# Patient Record
Sex: FEMALE | Race: BLACK OR AFRICAN AMERICAN | Employment: UNEMPLOYED | URBAN - METROPOLITAN AREA
[De-identification: names, ages, dates, MRNs, and addresses within clinical notes are randomized per-mention and may not be internally consistent; named-entity substitution may affect disease eponyms.]

---

## 2018-07-09 ENCOUNTER — OFFICE VISIT (OUTPATIENT)
Dept: OBGYN CLINIC | Age: 23
End: 2018-07-09

## 2018-07-09 VITALS
HEART RATE: 83 BPM | TEMPERATURE: 98.6 F | SYSTOLIC BLOOD PRESSURE: 103 MMHG | BODY MASS INDEX: 15.3 KG/M2 | DIASTOLIC BLOOD PRESSURE: 63 MMHG | OXYGEN SATURATION: 100 % | WEIGHT: 95.2 LBS | HEIGHT: 66 IN | RESPIRATION RATE: 16 BRPM

## 2018-07-09 DIAGNOSIS — N92.6 MISSED MENSES: Primary | ICD-10-CM

## 2018-07-09 DIAGNOSIS — R11.0 NAUSEA: ICD-10-CM

## 2018-07-09 DIAGNOSIS — Z3A.01 LESS THAN 8 WEEKS GESTATION OF PREGNANCY: ICD-10-CM

## 2018-07-09 LAB
HCG URINE, QL. (POC): POSITIVE
VALID INTERNAL CONTROL?: YES

## 2018-07-09 RX ORDER — ONDANSETRON 4 MG/1
4 TABLET, ORALLY DISINTEGRATING ORAL
Qty: 30 TAB | Refills: 3 | Status: SHIPPED | OUTPATIENT
Start: 2018-07-09 | End: 2022-01-26

## 2018-07-09 NOTE — MR AVS SNAPSHOT
[address] [facility] 
 
 
 [address], [address] [place] [address] 
[phone] Patient: Lexy Mederos MRN: [id number] :1995 Visit Information Date & Time Provider Department Dept. Phone Encounter #  
 2018  2:30 PM MD Glendy Palacio OB [phone] [id number]  
  
 2018  9:30 AM  
OB VISIT with Richard Loaiza MD  
[facility] OB GYN ([address] [facility]) Appt Note: ob visit [address], [place] [address] [id number] [phone]  
  
   
 [address], [address] [facility] Upcoming Health Maintenance Date Due  
 HPV Age 9Y-34Y (1 of 3 - Female 3 Dose Series) 10/3/2008 Influenza Age 5 to Adult 2018 Allergies as of 2018  Review Complete On: 2018 By: Richard Loaiza MD  
 No Known Allergies Current Immunizations  Never Reviewed No immunizations on file. Not reviewed this visit You Were Diagnosed With   
  
 Codes Comments Missed menses    -  Primary ICD-10-CM: N92.6 ICD-9-CM: 626.4 Less than 8 weeks gestation of pregnancy     ICD-10-CM: Z3A.01 
ICD-9-CM: V22.2 Nausea     ICD-10-CM: R11.0 ICD-9-CM: 787.02 Vitals BP Pulse Temp Resp Height(growth percentile) Weight(growth percentile) 103/63 (BP 1 Location: Left arm, BP Patient Position: Sitting) 83 98.6 °F (37 °C) (Oral) 16 5' 6\" (1.676 m) 95 lb 3.2 oz (43.2 kg) LMP SpO2 BMI OB Status Smoking Status 2018 100% 15.37 kg/m2 Pregnant Never Smoker BMI and BSA Data Body Mass Index Body Surface Area  
 15.37 kg/m 2 1.42 m 2 Preferred Pharmacy Pharmacy Name Phone [address] [facility] [address], [address] [phone] Your Updated Medication List  
  
   
This list is accurate as of 18  3:46 PM.  Always use your most recent med list.  
  
  
  
  
 ondansetron 4 mg disintegrating tablet Commonly known as:  ZOFRAN ODT Take 1 Tab by mouth every eight (8) hours as needed for Nausea. Indications: EXCESSIVE VOMITING IN PREGNANCY  
  
 prenatal vit-iron fumarate-fa 27 mg iron- 0.8 mg Tab tablet Take 1 Tab by mouth daily. Indications: pregnancy Prescriptions Sent to Pharmacy Refills  
 prenatal vit-iron fumarate-fa 27 mg iron- 0.8 mg tab tablet 3 Sig: Take 1 Tab by mouth daily. Indications: pregnancy Class: Normal  
 Pharmacy: [address] , [address] [place] [address] #: [phone] Route: Oral  
 ondansetron (ZOFRAN ODT) 4 mg disintegrating tablet 3 Sig: Take 1 Tab by mouth every eight (8) hours as needed for Nausea. Indications: EXCESSIVE VOMITING IN PREGNANCY Class: Normal  
 Pharmacy: [address] , [address] [place] [address] #: [phone] Route: Oral  
  
We Performed the Following AMB POC URINE PREGNANCY TEST, VISUAL COLOR COMPARISON [53517 CPT(R)] Patient Instructions Nausea and Vomiting: Care Instructions Your Care Instructions When you are nauseated, you may feel weak and sweaty and notice a lot of saliva in your mouth. Nausea often leads to vomiting. Most of the time you do not need to worry about nausea and vomiting, but they can be signs of other illnesses. Two common causes of nausea and vomiting are stomach flu and food poisoning. Nausea and vomiting from viral stomach flu will usually start to improve within 24 hours. Nausea and vomiting from food poisoning may last from 12 to 48 hours. The doctor has checked you carefully, but problems can develop later. If you notice any problems or new symptoms, get medical treatment right away. Follow-up care is a key part of your treatment and safety. Be sure to make and go to all appointments, and call your doctor if you are having problems. It's also a good idea to know your test results and keep a list of the medicines you take. How can you care for yourself at home? · To prevent dehydration, drink plenty of fluids, enough so that your urine is light yellow or clear like water. Choose water and other caffeine-free clear liquids until you feel better. If you have kidney, heart, or liver disease and have to limit fluids, talk with your doctor before you increase the amount of fluids you drink. · Rest in bed until you feel better. · When you are able to eat, try clear soups, mild foods, and liquids until all symptoms are gone for 12 to 48 hours. Other good choices include dry toast, crackers, cooked cereal, and gelatin dessert, such as Jell-O. When should you call for help? Call 911 anytime you think you may need emergency care. For example, call if: 
? · You passed out (lost consciousness). ?Call your doctor now or seek immediate medical care if: 
? · You have symptoms of dehydration, such as: ¨ Dry eyes and a dry mouth. ¨ Passing only a little dark urine. ¨ Feeling thirstier than usual.  
? · You have new or worsening belly pain. ? · You have a new or higher fever. ? · You vomit blood or what looks like coffee grounds. ? Watch closely for changes in your health, and be sure to contact your doctor if: 
? · You have ongoing nausea and vomiting. ? · Your vomiting is getting worse. ? · Your vomiting lasts longer than 2 days. ? · You are not getting better as expected. Where can you learn more? Go to http://shanti-dhruv.info/. Enter 25 753728 in the search box to learn more about \"Nausea and Vomiting: Care Instructions. \" Current as of: March 20, 2017 Content Version: 11.4 © 9902-0762 [employer]. Care instructions adapted under license by Veeva (which disclaims liability or warranty for this information).  If you have questions about a medical condition or this instruction, always ask your healthcare professional. Y[employer] disclaims any warranty or liability for your use of this information. Weeks 6 to 10 of Your Pregnancy: Care Instructions Your Care Instructions Congratulations on your pregnancy. This is an exciting and important time for you. During the first 6 to 10 weeks of your pregnancy, your body goes through many changes. Your baby grows very fast, even though you cannot feel it yet. You may start to notice that you feel different, both in your body and your emotions. Because each woman's pregnancy is unique, there is no right way to feel. You may feel the healthiest you have ever been, or you may feel tired or sick to your stomach (\"morning sickness\"). These early weeks are a time to make healthy choices and to eat the best foods for you and your baby. This care sheet will give you some ideas. This is also a good time to think about birth defects testing. These are tests done during pregnancy to look for possible problems with the baby. First trimester tests for birth defects can be done between 8 and 17 weeks of pregnancy, depending on the test. Talk with your doctor about what kinds of tests are available. Follow-up care is a key part of your treatment and safety. Be sure to make and go to all appointments, and call your doctor if you are having problems. It's also a good idea to know your test results and keep a list of the medicines you take. How can you care for yourself at home? Eat well · Eat at least 3 meals and 2 healthy snacks every day. Eat fresh, whole foods, including: ¨ 7 or more servings of bread, tortillas, cereal, rice, pasta, or oatmeal. 
¨ 3 or more servings of vegetables, especially leafy green vegetables. ¨ 2 or more servings of fruits. ¨ 3 or more servings of milk, yogurt, or cheese. ¨ 2 or more servings of meat, turkey, chicken, fish, eggs, or dried beans. · Drink plenty of fluids, especially water. Avoid sodas and other sweetened drinks. · Choose foods that have important vitamins for your baby, such as calcium, iron, and folate. ¨ Dairy products, tofu, canned fish with bones, almonds, broccoli, dark leafy greens, corn tortillas, and fortified orange juice are good sources of calcium. ¨ Beef, poultry, liver, spinach, lentils, dried beans, fortified cereals, and dried fruits are rich in iron. ¨ Dark leafy greens, broccoli, asparagus, liver, fortified cereals, orange juice, peanuts, and almonds are good sources of folate. · Avoid foods that could harm your baby. ¨ Do not eat raw or undercooked meat, chicken, or fish (such as sushi or raw oysters). ¨ Do not eat raw eggs or foods that contain raw eggs, such as Caesar dressing. ¨ Do not eat soft cheeses and unpasteurized dairy foods, such as Brie, feta, or blue cheese. ¨ Do not eat fish that contains a lot of mercury, such as shark, swordfish, tilefish, or kartik mackerel. Do not eat more than 6 ounces of tuna each week. ¨ Do not eat raw sprouts, especially alfalfa sprouts. ¨ Cut down on caffeine, such as coffee, tea, and cola. Protect yourself and your baby · Do not touch sumit litter or cat feces. They can cause an infection that could harm your baby. · High body temperature can be harmful to your baby. So if you want to use a sauna or hot tub, be sure to talk to your doctor about how to use it safely. [place] with morning sickness · Sip small amounts of water, juices, or shakes. Try drinking between meals, not with meals. · Eat 5 or 6 small meals a day. Try dry toast or crackers when you first get up, and eat breakfast a little later. · Avoid spicy, greasy, and fatty foods. · When you feel sick, open your windows or go for a short walk to get fresh air. · Try nausea wristbands. These help some women. · Tell your doctor if you think your prenatal vitamins make you sick. Where can you learn more? Go to http://shanti-dhruv.info/. Enter G112 in the search box to learn more about \"Weeks 6 to 10 of Your Pregnancy: Care Instructions. \" Current as of: March 16, 2017 Content Version: 11.4 © 1556-1547 Healthwise, Incorporated. Care instructions adapted under license by Remote Assistant (which disclaims liability or warranty for this information). If you have questions about a medical condition or this instruction, always ask your healthcare professional. [place] [address] any warranty or liability for your use of this information. Introducing [facility]! Jose Alfredo Mosley introduces OurStay patient portal. Now you can access parts of your medical record, email your doctor's office, and request medication refills online. 1. In your internet browser, go to [email or web address] 2. Click on the First Time User? Click Here link in the Sign In box. You will see the New Member Sign Up page. 3. Enter your OurStay Access Code exactly as it appears below. You will not need to use this code after youve completed the sign-up process. If you do not sign up before the expiration date, you must request a new code. · OurStay Access Code: [email or web address] Expires: 10/7/2018  2:58 PM 
 
4. Enter the last four digits of your Social Security Number (xxxx) and Date of Birth (mm/dd/yyyy) as indicated and click Submit. You will be taken to the next sign-up page. 5. Create a OurStay ID. This will be your OurStay login ID and cannot be changed, so think of one that is secure and easy to remember. 6. Create a OurStay password. You can change your password at any time. 7. Enter your Password Reset Question and Answer. This can be used at a later time if you forget your password. 8. Enter your e-mail address. You will receive e-mail notification when new information is available in [address] 9. Click Sign Up. You can now view and download portions of your medical record. 10. Click the Download Summary menu link to download a portable copy of your medical information. If you have questions, please visit the Frequently Asked Questions section of the PayRange website. Remember, PayRange is NOT to be used for urgent needs. For medical emergencies, dial 911. Now available from your iPhone and Android! Please provide this summary of care documentation to your next provider. If you have any questions after today's visit, please call [phone].

## 2018-07-09 NOTE — PATIENT INSTRUCTIONS
Nausea and Vomiting: Care Instructions Your Care Instructions When you are nauseated, you may feel weak and sweaty and notice a lot of saliva in your mouth. Nausea often leads to vomiting. Most of the time you do not need to worry about nausea and vomiting, but they can be signs of other illnesses. Two common causes of nausea and vomiting are stomach flu and food poisoning. Nausea and vomiting from viral stomach flu will usually start to improve within 24 hours. Nausea and vomiting from food poisoning may last from 12 to 48 hours. The doctor has checked you carefully, but problems can develop later. If you notice any problems or new symptoms, get medical treatment right away. Follow-up care is a key part of your treatment and safety. Be sure to make and go to all appointments, and call your doctor if you are having problems. It's also a good idea to know your test results and keep a list of the medicines you take. How can you care for yourself at home? · To prevent dehydration, drink plenty of fluids, enough so that your urine is light yellow or clear like water. Choose water and other caffeine-free clear liquids until you feel better. If you have kidney, heart, or liver disease and have to limit fluids, talk with your doctor before you increase the amount of fluids you drink. · Rest in bed until you feel better. · When you are able to eat, try clear soups, mild foods, and liquids until all symptoms are gone for 12 to 48 hours. Other good choices include dry toast, crackers, cooked cereal, and gelatin dessert, such as Jell-O. When should you call for help? Call 911 anytime you think you may need emergency care. For example, call if: 
? · You passed out (lost consciousness). ?Call your doctor now or seek immediate medical care if: 
? · You have symptoms of dehydration, such as: ¨ Dry eyes and a dry mouth. ¨ Passing only a little dark urine.  
¨ Feeling thirstier than usual.  
? · You have new or worsening belly pain. ? · You have a new or higher fever. ? · You vomit blood or what looks like coffee grounds. ? Watch closely for changes in your health, and be sure to contact your doctor if: 
? · You have ongoing nausea and vomiting. ? · Your vomiting is getting worse. ? · Your vomiting lasts longer than 2 days. ? · You are not getting better as expected. Where can you learn more? Go to http://shanti-dhruv.info/. Enter 25 112356 in the search box to learn more about \"Nausea and Vomiting: Care Instructions. \" Current as of: March 20, 2017 Content Version: 11.4 © 8810-6627 [employer]B Access. Care instructions adapted under license by Jet[employer]ore (which disclaims liability or warranty for this information). If you have questions about a medical condition or this instruction, always ask your healthcare professional. [place] [address] any warranty or liability for your use of this information. Weeks 6 to 10 of Your Pregnancy: Care Instructions Your Care Instructions Congratulations on your pregnancy. This is an exciting and important time for you. During the first 6 to 10 weeks of your pregnancy, your body goes through many changes. Your baby grows very fast, even though you cannot feel it yet. You may start to notice that you feel different, both in your body and your emotions. Because each woman's pregnancy is unique, there is no right way to feel. You may feel the healthiest you have ever been, or you may feel tired or sick to your stomach (\"morning sickness\"). These early weeks are a time to make healthy choices and to eat the best foods for you and your baby. This care sheet will give you some ideas. This is also a good time to think about birth defects testing. These are tests done during pregnancy to look for possible problems with the baby.  First trimester tests for birth defects can be done between 8 and 13 weeks of pregnancy, depending on the test. Talk with your doctor about what kinds of tests are available. Follow-up care is a key part of your treatment and safety. Be sure to make and go to all appointments, and call your doctor if you are having problems. It's also a good idea to know your test results and keep a list of the medicines you take. How can you care for yourself at home? Eat well · Eat at least 3 meals and 2 healthy snacks every day. Eat fresh, whole foods, including: ¨ 7 or more servings of bread, tortillas, cereal, rice, pasta, or oatmeal. 
¨ 3 or more servings of vegetables, especially leafy green vegetables. ¨ 2 or more servings of fruits. ¨ 3 or more servings of milk, yogurt, or cheese. ¨ 2 or more servings of meat, turkey, chicken, fish, eggs, or dried beans. · Drink plenty of fluids, especially water. Avoid sodas and other sweetened drinks. · Choose foods that have important vitamins for your baby, such as calcium, iron, and folate. ¨ Dairy products, tofu, canned fish with bones, almonds, broccoli, dark leafy greens, corn tortillas, and fortified orange juice are good sources of calcium. ¨ Beef, poultry, liver, spinach, lentils, dried beans, fortified cereals, and dried fruits are rich in iron. ¨ Dark leafy greens, broccoli, asparagus, liver, fortified cereals, orange juice, peanuts, and almonds are good sources of folate. · Avoid foods that could harm your baby. ¨ Do not eat raw or undercooked meat, chicken, or fish (such as sushi or raw oysters). ¨ Do not eat raw eggs or foods that contain raw eggs, such as Caesar dressing. ¨ Do not eat soft cheeses and unpasteurized dairy foods, such as Brie, feta, or blue cheese. ¨ Do not eat fish that contains a lot of mercury, such as shark, swordfish, tilefish, or kartik mackerel. Do not eat more than 6 ounces of tuna each week. ¨ Do not eat raw sprouts, especially alfalfa sprouts. ¨ Cut down on caffeine, such as coffee, tea, and cola.  
Protect yourself and your baby · Do not touch sumit litter or cat feces. They can cause an infection that could harm your baby. · High body temperature can be harmful to your baby. So if you want to use a sauna or hot tub, be sure to talk to your doctor about how to use it safely. [place] with morning sickness · Sip small amounts of water, juices, or shakes. Try drinking between meals, not with meals. · Eat 5 or 6 small meals a day. Try dry toast or crackers when you first get up, and eat breakfast a little later. · Avoid spicy, greasy, and fatty foods. · When you feel sick, open your windows or go for a short walk to get fresh air. · Try nausea wristbands. These help some women. · Tell your doctor if you think your prenatal vitamins make you sick. Where can you learn more? Go to http://shanti-dhruv.info/. Enter G112 in the search box to learn more about \"Weeks 6 to 10 of Your Pregnancy: Care Instructions. \" Current as of: March 16, 2017 Content Version: 11.4 © 2563-4950 [employer]CLUB. Care instructions adapted under license by [employer]ova [employer] (which disclaims liability or warranty for this information). If you have questions about a medical condition or this instruction, always ask your healthcare professional. [address] any warranty or liability for your use of this information.

## 2018-07-09 NOTE — PROGRESS NOTES
Progress Note    Patient: Ameena Whatley  MRN: [id number]  Date: 7/9/2018     Age:  23 y.o.,      Sex: female    YOB: 1995    Ameena Whatley is a 23y.o. year-old female, G 1 P 0 whose last normal menstrual period was 5/7/18 . She is not on any contraceptive. She presents today for missed menses. Review of Systems: General, Cardiovascular, Respiratory, Gastrointestinal, Genitourinary, Neuropsychiatry, Musculoskeletal. Patient denies any problems associated with these systems except for the amenorrhea. .    General Examination: She is a well-developed, well-nourished female in no acute distress. Cardiovascular system--normal sinus rhythm, no murmurs or gallop. Abdomen--soft, nontender, and normal active. Pelvic exam--External genitalia and BUS--normal.             Cervix: bluish cervical coloration    Vagina: vaginal discharge white and odorless                          Uterus: enlarged, 9 weeks size, regular contour    Adnexa: normal bimanual exam    Impression. --Missed Menses. Early Pregnancy    Plan: --RTC for Initial prenatal care.                Rx for prenatal vitamins sent    Mario Johnson MD  July 9, 2018

## 2021-08-06 LAB
CHLAMYDIA, EXTERNAL: NEGATIVE
HBSAG, EXTERNAL: NEGATIVE
HIV, EXTERNAL: NEGATIVE
N. GONORRHEA, EXTERNAL: NEGATIVE
RPR, EXTERNAL: NON REACTIVE
RUBELLA, EXTERNAL: NORMAL
TYPE, ABO & RH, EXTERNAL: NORMAL

## 2022-01-07 LAB — GRBS, EXTERNAL: NEGATIVE

## 2022-01-24 ENCOUNTER — HOSPITAL ENCOUNTER (INPATIENT)
Age: 27
LOS: 2 days | Discharge: HOME | DRG: 560 | End: 2022-01-26
Attending: OBSTETRICS & GYNECOLOGY | Admitting: OBSTETRICS & GYNECOLOGY
Payer: MEDICAID

## 2022-01-24 PROBLEM — Z34.93 NORMAL IUP (INTRAUTERINE PREGNANCY) ON PRENATAL ULTRASOUND, THIRD TRIMESTER: Status: ACTIVE | Noted: 2022-01-24

## 2022-01-24 LAB
ABO + RH BLD: NORMAL
BASOPHILS # BLD: 0 K/UL (ref 0–0.1)
BASOPHILS NFR BLD: 0 % (ref 0–2)
BLOOD GROUP ANTIBODIES SERPL: NORMAL
DIFFERENTIAL METHOD BLD: ABNORMAL
EOSINOPHIL # BLD: 0 K/UL (ref 0–0.4)
EOSINOPHIL NFR BLD: 0 % (ref 0–5)
ERYTHROCYTE [DISTWIDTH] IN BLOOD BY AUTOMATED COUNT: 15.9 % (ref 11.6–14.5)
HCT VFR BLD AUTO: 31.9 % (ref 35–45)
HGB BLD-MCNC: 9.9 G/DL (ref 12–16)
IMM GRANULOCYTES # BLD AUTO: 0.1 K/UL (ref 0–0.04)
IMM GRANULOCYTES NFR BLD AUTO: 1 % (ref 0–0.5)
LYMPHOCYTES # BLD: 1.3 K/UL (ref 0.9–3.6)
LYMPHOCYTES NFR BLD: 12 % (ref 21–52)
MCH RBC QN AUTO: 21.9 PG (ref 24–34)
MCHC RBC AUTO-ENTMCNC: 31 G/DL (ref 31–37)
MCV RBC AUTO: 70.6 FL (ref 78–100)
MONOCYTES # BLD: 0.8 K/UL (ref 0.05–1.2)
MONOCYTES NFR BLD: 7 % (ref 3–10)
NEUTS SEG # BLD: 8.5 K/UL (ref 1.8–8)
NEUTS SEG NFR BLD: 80 % (ref 40–73)
NRBC # BLD: 0 K/UL (ref 0–0.01)
NRBC BLD-RTO: 0 PER 100 WBC
PLATELET # BLD AUTO: 290 K/UL (ref 135–420)
PMV BLD AUTO: 11.3 FL (ref 9.2–11.8)
RBC # BLD AUTO: 4.52 M/UL (ref 4.2–5.3)
SPECIMEN EXP DATE BLD: NORMAL
WBC # BLD AUTO: 10.6 K/UL (ref 4.6–13.2)

## 2022-01-24 PROCEDURE — 86901 BLOOD TYPING SEROLOGIC RH(D): CPT

## 2022-01-24 PROCEDURE — 74011250636 HC RX REV CODE- 250/636: Performed by: ADVANCED PRACTICE MIDWIFE

## 2022-01-24 PROCEDURE — 75410000003 HC RECOV DEL/VAG/CSECN EA 0.5 HR

## 2022-01-24 PROCEDURE — 85025 COMPLETE CBC W/AUTO DIFF WBC: CPT

## 2022-01-24 PROCEDURE — 75410000000 HC DELIVERY VAGINAL/SINGLE

## 2022-01-24 PROCEDURE — 74011250637 HC RX REV CODE- 250/637: Performed by: ADVANCED PRACTICE MIDWIFE

## 2022-01-24 PROCEDURE — 65270000029 HC RM PRIVATE

## 2022-01-24 PROCEDURE — 75410000002 HC LABOR FEE PER 1 HR

## 2022-01-24 RX ORDER — MINERAL OIL
30 OIL (ML) ORAL AS NEEDED
Status: COMPLETED | OUTPATIENT
Start: 2022-01-24 | End: 2022-01-24

## 2022-01-24 RX ORDER — ACETAMINOPHEN 325 MG/1
650 TABLET ORAL
Status: DISCONTINUED | OUTPATIENT
Start: 2022-01-24 | End: 2022-01-26 | Stop reason: HOSPADM

## 2022-01-24 RX ORDER — PROMETHAZINE HYDROCHLORIDE 25 MG/ML
25 INJECTION, SOLUTION INTRAMUSCULAR; INTRAVENOUS
Status: DISCONTINUED | OUTPATIENT
Start: 2022-01-24 | End: 2022-01-26 | Stop reason: HOSPADM

## 2022-01-24 RX ORDER — OXYTOCIN/RINGER'S LACTATE 30/500 ML
10 PLASTIC BAG, INJECTION (ML) INTRAVENOUS AS NEEDED
Status: COMPLETED | OUTPATIENT
Start: 2022-01-24 | End: 2022-01-24

## 2022-01-24 RX ORDER — SODIUM CHLORIDE, SODIUM LACTATE, POTASSIUM CHLORIDE, CALCIUM CHLORIDE 600; 310; 30; 20 MG/100ML; MG/100ML; MG/100ML; MG/100ML
125 INJECTION, SOLUTION INTRAVENOUS CONTINUOUS
Status: DISCONTINUED | OUTPATIENT
Start: 2022-01-24 | End: 2022-01-25 | Stop reason: HOSPADM

## 2022-01-24 RX ORDER — AMOXICILLIN 250 MG
1 CAPSULE ORAL
Status: DISCONTINUED | OUTPATIENT
Start: 2022-01-24 | End: 2022-01-26 | Stop reason: HOSPADM

## 2022-01-24 RX ORDER — HYDROMORPHONE HYDROCHLORIDE 1 MG/ML
1 INJECTION, SOLUTION INTRAMUSCULAR; INTRAVENOUS; SUBCUTANEOUS
Status: DISCONTINUED | OUTPATIENT
Start: 2022-01-24 | End: 2022-01-24

## 2022-01-24 RX ORDER — BUTORPHANOL TARTRATE 2 MG/ML
2 INJECTION INTRAMUSCULAR; INTRAVENOUS
Status: DISCONTINUED | OUTPATIENT
Start: 2022-01-24 | End: 2022-01-24

## 2022-01-24 RX ORDER — NALBUPHINE HYDROCHLORIDE 10 MG/ML
10 INJECTION, SOLUTION INTRAMUSCULAR; INTRAVENOUS; SUBCUTANEOUS
Status: DISCONTINUED | OUTPATIENT
Start: 2022-01-24 | End: 2022-01-24

## 2022-01-24 RX ORDER — LIDOCAINE HYDROCHLORIDE 10 MG/ML
20 INJECTION, SOLUTION EPIDURAL; INFILTRATION; INTRACAUDAL; PERINEURAL AS NEEDED
Status: DISCONTINUED | OUTPATIENT
Start: 2022-01-24 | End: 2022-01-24

## 2022-01-24 RX ORDER — TERBUTALINE SULFATE 1 MG/ML
0.25 INJECTION SUBCUTANEOUS
Status: DISCONTINUED | OUTPATIENT
Start: 2022-01-24 | End: 2022-01-25 | Stop reason: HOSPADM

## 2022-01-24 RX ORDER — OXYTOCIN/0.9 % SODIUM CHLORIDE 30/500 ML
87.3 PLASTIC BAG, INJECTION (ML) INTRAVENOUS AS NEEDED
Status: DISCONTINUED | OUTPATIENT
Start: 2022-01-24 | End: 2022-01-25 | Stop reason: HOSPADM

## 2022-01-24 RX ORDER — IBUPROFEN 400 MG/1
800 TABLET ORAL
Status: DISCONTINUED | OUTPATIENT
Start: 2022-01-24 | End: 2022-01-26 | Stop reason: HOSPADM

## 2022-01-24 RX ORDER — LANOLIN ALCOHOL/MO/W.PET/CERES
3 CREAM (GRAM) TOPICAL
Status: DISCONTINUED | OUTPATIENT
Start: 2022-01-24 | End: 2022-01-26 | Stop reason: HOSPADM

## 2022-01-24 RX ORDER — METHYLERGONOVINE MALEATE 0.2 MG/ML
0.2 INJECTION INTRAVENOUS AS NEEDED
Status: DISCONTINUED | OUTPATIENT
Start: 2022-01-24 | End: 2022-01-25 | Stop reason: HOSPADM

## 2022-01-24 RX ORDER — ONDANSETRON 2 MG/ML
4 INJECTION INTRAMUSCULAR; INTRAVENOUS
Status: DISCONTINUED | OUTPATIENT
Start: 2022-01-24 | End: 2022-01-25 | Stop reason: HOSPADM

## 2022-01-24 RX ADMIN — Medication 10000 MILLI-UNITS: at 21:34

## 2022-01-24 RX ADMIN — MINERAL OIL 30 ML: 1000 SOLUTION ORAL at 21:15

## 2022-01-24 NOTE — PROGRESS NOTES
1500 , 39 1/7 weeks pregnant here for contractions since yesterday. 1520 Pt is 5cm, M Angie informed. Admission orders received. 1710 Pt on birthing ball. 191 Bedside and Verbal shift change report given to 1334 Alicia Cardona (oncoming nurse) by Sofi Hunt RN  (offgoing nurse). Report included the following information SBAR, Kardex, Intake/Output, MAR, Recent Results and Med Rec Status.

## 2022-01-24 NOTE — H&P
History & Physical    Name: Brianne Montes MRN: [id number]  SSN: [id number]    YOB: 1995  Age: 27 y.o. Sex: female        Subjective:     Estimated Date of Delivery: 22  OB History    Para Term  AB Living   1             SAB IAB Ectopic Molar Multiple Live Births                    # Outcome Date GA Lbr Brandt/2nd Weight Sex Delivery Anes PTL Lv   1 Current                Ms. Genaro Pruitt is admitted with pregnancy at 36w3d for active labor. Prenatal course was complicated by anemia. Please see prenatal records for details. No past medical history on file. No past surgical history on file. Social History     Occupational History    Not on file   Tobacco Use    Smoking status: Never Smoker    Smokeless tobacco: Never Used   Substance and Sexual Activity    Alcohol use: No    Drug use: No    Sexual activity: Never     Partners: Male     Birth control/protection: None     Family History   Family history unknown: Yes       No Known Allergies  Prior to Admission medications    Medication Sig Start Date End Date Taking? Authorizing Provider   prenatal vit-iron fumarate-fa 27 mg iron- 0.8 mg tab tablet Take 1 Tab by mouth daily. Indications: pregnancy 18  Yes Jerilyn Longo MD   ondansetron (ZOFRAN ODT) 4 mg disintegrating tablet Take 1 Tab by mouth every eight (8) hours as needed for Nausea. Indications: EXCESSIVE VOMITING IN PREGNANCY  Patient not taking: Reported on 2022   Jerilyn Longo MD        Review of Systems: A comprehensive review of systems was negative except for that written in the HPI.     Objective:     Vitals:  Vitals:    22 1501 22 1503 22 1508 22 1510   BP: 134/81      Pulse: 99      Temp:  98 °F (36.7 °C)     SpO2:  98% 99% 92%   Weight: 62.6 kg (138 lb)      Height: 5' 1\" (1.549 m)           Physical Exam:  Cervical Exam: 5 cm dilated    100% effaced    -1 station    Presenting Part: cephalic  Cervical Position: mid position  Membranes:  Intact  Fetal Heart Rate: Baseline: 1-5 per minute  Variability: moderate  Accelerations: yes  Decelerations: none  Uterine contractions: regular, every 1-5 minutes    Prenatal Labs:   No results found for: ABORH, RUBELLAEXT, GRBSEXT, HBSAGEXT, HIVEXT, RPREXT, GONNOEXT, CHLAMEXT, ABORHEXT, RUBELLAEXT, GRBSEXT, HBSAGEXT, HIVEXT, RPREXT, GONNOEXT, CHLAMEXT      Assessment/Plan:     Active Problems:    * No active hospital problems. *       Plan: Admit for active labor. Group B Strep was negative.  BIRDIE upon request. Anticipate vaginal delivery

## 2022-01-25 LAB
HCT VFR BLD AUTO: 30.7 % (ref 35–45)
HGB BLD-MCNC: 9.8 G/DL (ref 12–16)

## 2022-01-25 PROCEDURE — 65270000029 HC RM PRIVATE

## 2022-01-25 PROCEDURE — 85018 HEMOGLOBIN: CPT

## 2022-01-25 PROCEDURE — 36415 COLL VENOUS BLD VENIPUNCTURE: CPT

## 2022-01-25 NOTE — PROGRESS NOTES
DELIVERY SUMMARY    Patient:  Raford Dakin    Delivery Type: Vaginal, Spontaneous   Delivery Date:   Delivery Time:  2129 PM      Birth Weight:   2750 g     Sex: female     Delivery Clinician:  Merissa Morales   Gestational Age: 36+2 wga     Presentation: Vertex   Position:   OA to Right Occiput  Anterior      Apgars were 9  and 9       Resuscitation Method: Tactile Stimulation;Suctioning-bulb     Meconium Stained: None    Living Status: Living        Placenta Date/Time: [id number]   2134   PM   Placenta Removal: Spontaneous, trailing membranes  Placenta Appearance: Normal     Cord Information: 3 Vessels          Disposition of Cord Blood: lab ABO/DNA    Blood Gases Sent?:  No         Cord pH:  none     Episiotomy: none  Laceration(s):  bilateral periurethral - no repair    Estimated Blood Loss (ml): 100     Labor Events  Method:     spontaneous  Augmentation:  AROM  Cervical Ripening:   n/a     Induction - no     Induction Indication - N/A     Induction Method - N/A     Complications - none     NICU Admit - no     HTN Disorders - none     Diabetes - N/A     Operative Vaginal Delivery - none     Additional Delivery Comments -  viable female infant, OA to MARGOT, terminal meconium noted, infant to mother in stable condition for apgars of 9/9 , delayed cord clamping for 3 minutes until pulsations ceased, cord clamped and cut by FOB and cord blood for ABO/DNA obtained, pitocin infusing for active management of third stage, placenta delivered appearing intact, [place], spont.  With 3 vc - trailing membranes, fundus firm, perineum inspected - bilateral periurethral lacerations noted -  hemostatic,  ml,  Complications: none;    mother and infant in stable condition

## 2022-01-25 NOTE — PROGRESS NOTES
OB Labor Note    Assessment:   G1 @ 39+1 wga, IUP in active spontaneous labor - AROMc this exam   Low risk PPH   Cat I fetal tracing   Plans NCB - doing well with ctx    Plan:   Continue to monitor labor   Anticipate    Expectant Management       Subjective:   Pt. does not have any complaints. Pt. is feeling contractions. Pt. is feeling fetal movement. Objective:  V/E: 8/100/ 0 AROMc    Ctx: every 2-4 min. Cat I fetal tracing - baseline rate 135, accelerations, no decelerations, moderate variability     Visit Vitals  /75   Pulse 74   Temp 98.6 °F (37 °C)   Resp 16   Ht 5' 1\" (1.549 m)   Wt 62.6 kg (138 lb)   SpO2 100%   BMI 26.07 kg/m²      Cervical Exam  Dilation (cm): 5  Eff: 100 %  Station: -1  Vaginal exam done by? : Lilia  Membrane Status: Intact     Patient Vitals for the past 4 hrs:    Mode Fetal Heart Rate Variability Decelerations Accelerations   22 1900 External 130 6-25 BPM None Yes   22 1830 External 130 6-25 BPM None Yes   22 1700 External 125 6-25 BPM None Yes          2022 8:31 PM

## 2022-01-25 NOTE — LACTATION NOTE
This note was copied from a baby's chart. 200 Mother requested help from this nurse to wake  up to breastfeed. This nurse showed mother stimulation techniques and tricks to keep  awake. [place] diaper changed and stimulated to wake up.  Mother verbalized and demonstrated understanding    26  latched

## 2022-01-25 NOTE — PROGRESS NOTES
Problem: Patient Education: Go to Patient Education Activity  Goal: Patient/Family Education  Outcome: Progressing Towards Goal     Problem: Vaginal Delivery: Postpartum Day 1  Goal: Off Pathway (Use only if patient is Off Pathway)  Outcome: Progressing Towards Goal  Goal: Activity/Safety  Outcome: Progressing Towards Goal  Goal: Consults, if ordered  Outcome: Progressing Towards Goal  Goal: Diagnostic Test/Procedures  Outcome: Progressing Towards Goal  Goal: Discharge Planning  Outcome: Progressing Towards Goal  Goal: Medications  Outcome: Progressing Towards Goal  Goal: Treatments/Interventions/Procedures  Outcome: Progressing Towards Goal  Goal: Psychosocial  Outcome: Progressing Towards Goal  Goal: *Vital signs within defined limits  Outcome: Progressing Towards Goal  Goal: *Labs within defined limits  Outcome: Progressing Towards Goal  Goal: *Hemodynamically stable  Outcome: Progressing Towards Goal  Goal: *Optimal pain control at patient's stated goal  Outcome: Progressing Towards Goal  Goal: *Participates in infant care  Outcome: Progressing Towards Goal  Goal: *Demonstrates progressive activity  Outcome: Progressing Towards Goal  Goal: *Performs self perineal care  Outcome: Progressing Towards Goal  Goal: *Appropriate parent-infant bonding  Outcome: Progressing Towards Goal  Goal: *Performs self breast care  Outcome: Progressing Towards Goal

## 2022-01-25 NOTE — PROGRESS NOTES
07:20 Bedside shift change report given to MELODY Perez RN & YANICK Villagomez (oncoming nurse) by Jimmy Martinez RN (offgoing nurse). Report included the following information SBAR, Kardex, Procedure Summary, Intake/Output, MAR and Recent Results. 08:40 Assessment completed at this time. 12:00 Patient resting in bed. No needs or concerns at this time. 15:05 Reassessment completed at this time. 19:05 Bedside shift change report given to RAEGAN Leung RN (oncoming nurse) by Lavon Diallo RN & YANICK Fair RN (offgoing nurse). Report included the following information SBAR, Kardex, Procedure Summary, Intake/Output, MAR and Recent Results.

## 2022-01-25 NOTE — ROUTINE PROCESS
191 Verbal bedside report received, care of patient assumed at this time , sitting up in bed, awake and alert. EFM/tocos in progress.  AROM with moderate amount clear fluid done by DANA Ram. SVE 8/100/0     C/o pressure in bottom, SVE anterior lip     DANA Ram at bedside for delivery. 0 Pushing with contractions      viable female per CNM. Infant with spontaneous cry placed on moms chest for skin to skin. 213 Spontaneous delivery of intact placenta per CNm. Pitocin infusion initiated at bolus rate. 2145 No laceration or repair. All counts correct, begin recovery period. 0040 Up to BR with minimal assistance, voided 500 ml without difficulty. Frances care done per patient. Tolerated well.    0100 TRANSFER - OUT REPORT:    Verbal report given to Remi Fraser RN(name) on Xavier Roberson  being transferred to mother baby(unit) for routine progression of care       Report consisted of patients Situation, Background, Assessment and   Recommendations(SBAR). Information from the following report(s) SBAR, Kardex, Intake/Output, MAR and Recent Results was reviewed with the receiving nurse. Lines:   Peripheral IV 22 Posterior;Right Hand (Active)        Opportunity for questions and clarification was provided.       Patient transported with:   Registered Nurse

## 2022-01-25 NOTE — PROGRESS NOTES
Problem: Vaginal Delivery: Postpartum Day 1  Goal: Activity/Safety  Outcome: Progressing Towards Goal  Goal: Diagnostic Test/Procedures  Outcome: Progressing Towards Goal  Goal: Discharge Planning  Outcome: Progressing Towards Goal  Goal: Medications  Outcome: Progressing Towards Goal  Goal: Treatments/Interventions/Procedures  Outcome: Progressing Towards Goal  Goal: Psychosocial  Outcome: Progressing Towards Goal  Goal: *Vital signs within defined limits  Outcome: Progressing Towards Goal  Goal: *Labs within defined limits  Outcome: Progressing Towards Goal  Goal: *Hemodynamically stable  Outcome: Progressing Towards Goal  Goal: *Optimal pain control at patient's stated goal  Outcome: Progressing Towards Goal  Goal: *Participates in infant care  Outcome: Progressing Towards Goal  Goal: *Demonstrates progressive activity  Outcome: Progressing Towards Goal  Goal: *Performs self perineal care  Outcome: Progressing Towards Goal  Goal: *Appropriate parent-infant bonding  Outcome: Progressing Towards Goal     Problem: Pain  Goal: *Control of Pain  Outcome: Progressing Towards Goal

## 2022-01-25 NOTE — PROGRESS NOTES
0720 Bedside and Verbal shift change report given to YANICK León RN and Dre Hoffman RN (oncoming nurse) by Eulogio Clemente RN (offgoing nurse). Report included the following information SBAR, Kardex, Procedure Summary, Intake/Output, MAR and Recent Results. 1905 Bedside and Verbal shift change report given to RAEGAN Acosta RN (oncoming nurse) by Dre Hoffman RN and YANICK Pedro RN(offgoing nurse). Report included the following information SBAR, Kardex, Procedure Summary, Intake/Output, MAR and Recent Results.

## 2022-01-26 VITALS
DIASTOLIC BLOOD PRESSURE: 63 MMHG | TEMPERATURE: 97.9 F | OXYGEN SATURATION: 100 % | SYSTOLIC BLOOD PRESSURE: 97 MMHG | RESPIRATION RATE: 18 BRPM | HEART RATE: 74 BPM | BODY MASS INDEX: 26.06 KG/M2 | HEIGHT: 61 IN | WEIGHT: 138 LBS

## 2022-01-26 RX ORDER — IBUPROFEN 800 MG/1
800 TABLET ORAL
Qty: 90 TABLET | Refills: 0 | Status: SHIPPED | OUTPATIENT
Start: 2022-01-26

## 2022-01-26 NOTE — DISCHARGE INSTRUCTIONS
Patient Education            POST DELIVERY DISCHARGE INSTRUCTIONS    Name: Arne Hodgkins  YOB: 1995  Primary Diagnosis: Principal Problem:     (spontaneous vaginal delivery) (2022)    Active Problems:    Normal IUP (intrauterine pregnancy) on prenatal ultrasound, third trimester (2022)        General:     Diet/Diet Restrictions:  Eight 8-ounce glasses of fluid daily (water, juices); avoid excessive caffeine intake. Meals/snacks as desired which are high in fiber and carbohydrates and low in fat and cholesterol. Physical Activity / Restrictions / Safety:     Avoid heavy lifting, no more that 8 lbs. For 2-3 weeks; limit use of stairs to 2 times daily for the first week home. No driving for one week. Avoid intercourse 4-6 weeks, no douching or tampon use. Check with obstetrician before starting or resuming an exercise program.         Discharge Instructions/Special Treatment/Home Care Needs:     Continue prenatal vitamins. Continue to use squirt bottle with warm water on your episiotomy after each bathroom use until bleeding stops. If steri-strips applied to your incision, remove in 7-10 days. Call your doctor for the following:     Fever over 101 degrees by mouth. Vaginal bleeding heavier than a normal menstrual period or clot larger than a golf ball. Red streaks or increased swelling of legs, painful red streaks on your breast.  Painful urination, constipation and increased pain or swelling or discharge with your incision. If you feel extremely anxious or overwhelmed. If you have thoughts of harming yourself and/or your baby. Pain Management:     Pain Management:   Take Acetaminophen (Tylenol) or Ibuprofen (Advil, Motrin), as directed for pain. Use a warm Sitz bath 3 times daily to relieve episiotomy or hemorrhoidal discomfort. Heating pad to  incision as needed. For hemorrhoidal discomfort, use Tucks and Anusol cream as needed and directed.         Follow-Up Care:     These are general instructions for a healthy lifestyle:    No smoking/ No tobacco products/ Avoid exposure to second hand smoke    Surgeon General's Warning:  Quitting smoking now greatly reduces serious risk to your health. Obesity, smoking, and sedentary lifestyle greatly increases your risk for illness    A healthy diet, regular physical exercise & weight monitoring are important for maintaining a healthy lifestyle    Recognize signs and symptoms of STROKE:    F-face looks uneven    A-arms unable to move or move unevenly    S-speech slurred or non-existent    T-time-call 911 as soon as signs and symptoms begin-DO NOT go       Back to bed or wait to see if you get better-TIME IS BRAIN. Patient Education        After Your Delivery (the Postpartum Period): Care Instructions  Your Care Instructions     Congratulations on the birth of your baby. Like pregnancy, the  period can be a time of excitement, kenyatta, and exhaustion. You may look at your wondrous little baby and feel happy. You may also be overwhelmed by your new sleep hours and new responsibilities. At first, babies often sleep during the days and are awake at night. They do not have a pattern or routine. They may make sudden gasps, jerk themselves awake, or look like they have crossed eyes. These are all normal, and they may even make you smile. In these first weeks after delivery, try to take good care of yourself. It may take 4 to 6 weeks to feel like yourself again, and possibly longer if you had a  birth. You will likely feel very tired for several weeks. Your days will be full of ups and downs, but lots of kenyatta as well. Follow-up care is a key part of your treatment and safety. Be sure to make and go to all appointments, and call your doctor if you are having problems. It's also a good idea to know your test results and keep a list of the medicines you take. How can you care for yourself at home?   Take care of your body after delivery  · Use pads instead of tampons for the bloody flow that may last as long as 2 weeks. · Ease cramps with ibuprofen (Advil, Motrin). · Ease soreness of hemorrhoids and the area between your vagina and rectum with ice compresses or witch hazel pads. · Ease constipation by drinking lots of fluid and eating high-fiber foods. Ask your doctor about over-the-counter stool softeners. · Cleanse yourself with a gentle squeeze of warm water from a bottle instead of wiping with toilet paper. · Take a sitz bath in warm water several times a day. · Wear a good nursing bra. Ease sore and swollen breasts with warm, wet washcloths. · If you aren't breastfeeding, use ice rather than heat for breast soreness. · Your period may not start for several months if you are breastfeeding. You may bleed more, and longer at first, than you did before you got pregnant. · Wait until you are healed (about 4 to 6 weeks) before you have sex. Ask your doctor when it is okay for you to have sex. · Try not to travel with your baby for 5 or 6 weeks. If you take a long car trip, make frequent stops to walk around and stretch. Avoid exhaustion  · Rest every day. Try to nap when your baby naps. · Ask another adult to be with you for a few days after delivery. · Plan for  if you have other children. · Stay flexible so you can eat at odd hours and sleep when you need to. Both you and your baby are making new schedules. · Plan small trips to get out of the house. Change can make you feel less tired. · Ask for help with housework, cooking, and shopping. Remind yourself that your job is to care for your baby. Know about help for postpartum depression  · \"Baby blues\" are common for the first 1 to 2 weeks after birth. You may cry or feel sad or irritable for no reason. · Rest whenever you can. Being tired makes it harder to handle your emotions. · Go for walks with your baby.   · Talk to your partner, friends, and family about your feelings. · If your symptoms last for more than a few weeks, or if you feel very depressed, ask your doctor for help. · Postpartum depression can be treated. Support groups and counseling can help. Sometimes medicine can also help. Stay healthy  · Eat healthy foods so you have more energy. · If you breastfeed, avoid drugs. If you quit smoking during pregnancy, try to stay smoke-free. If you choose to have a drink now and then, have only one drink, and limit the number of occasions that you have a drink. Wait to breastfeed at least 2 hours after you have a drink to reduce the amount of alcohol the baby may get in the milk. · Start daily exercise after 4 to 6 weeks, but rest when you feel tired. · Learn exercises to tone your belly. Do Kegel exercises to regain strength in your pelvic muscles. You can do these exercises while you stand or sit. ? Squeeze the same muscles you would use to stop your urine. Your belly and thighs should not move. ? Hold the squeeze for 3 seconds, and then relax for 3 seconds. ? Start with 3 seconds. Then add 1 second each week until you are able to squeeze for 10 seconds. ? Repeat the exercise 10 to 15 times for each session. Do three or more sessions each day. · Find a class for you and your baby that has an exercise time. · If you had a  birth, give yourself a bit more time before you exercise, and be careful. When should you call for help? Call 911  anytime you think you may need emergency care. For example, call if:    · You have thoughts of harming yourself, your baby, or another person.     · You passed out (lost consciousness).     · You have chest pain, are short of breath, or cough up blood.     · You have a seizure. Call your doctor now or seek immediate medical care if:    · You have severe vaginal bleeding.  This means you are passing blood clots and soaking through a pad each hour for 2 or more hours.     · You are dizzy or lightheaded, or you feel like you may faint.     · You have a fever.     · You have new or more belly pain.     · You have signs of a blood clot in your leg (called a deep vein thrombosis), such as:  ? Pain in the calf, back of the knee, thigh, or groin. ? Redness and swelling in your leg or groin.     · You have signs of preeclampsia, such as:  ? Sudden swelling of your face, hands, or feet. ? New vision problems (such as dimness, blurring, or seeing spots). ? A severe headache. Watch closely for changes in your health, and be sure to contact your doctor if:    · Your vaginal bleeding seems to be getting heavier.     · You have new or worse vaginal discharge.     · You feel sad, anxious, or hopeless for more than a few days.     · You do not get better as expected. Where can you learn more? Go to http://www.webster.com/  Enter A461 in the search box to learn more about \"After Your Delivery (the Postpartum Period): Care Instructions. \"  Current as of: June 16, 2021               Content Version: 13.0  © 3766-0527 [employer]. Care instructions adapted under license by [employer] (which disclaims liability or warranty for this information). If you have questions about a medical condition or this instruction, always ask your healthcare professional. [place] [address] any warranty or liability for your use of this information.

## 2022-01-26 NOTE — LACTATION NOTE
This note was copied from a baby's chart. 5 Mom educated on breastfeeding basics--hunger cues, feeding on demand, waking baby if baby sleeps too long between feeds, importance of skin to skin, positioning and latching, risk of pacifier use and supplemental feedings, and importance of rooming in--and use of log sheet. Mom also educated on benefits of breastfeeding for herself and baby. Mom verbalized understanding. No questions at this time. Per mom, infant latching and nursing well, but nipples are sore. Encourage lanolin usage and to call for the next feeding. 36  [facility] called to room. [place] still sleeping. Discussed ways to stimulate . Will return. [phone] per mom, infant latched and nursed well. Mom was able to show the  [facility] a video of the  latch. Latch looked deep and flanged. Mom stated \"my nipples are really sore, what can I do? \" Discussed latch, position, and lanolin usage. Provided mom with gel pads. Mom asked LC about when to introduce bottles. Encouraged to wait until at least 2 weeks. Discussed and taught how to burp , encouraged burping after feeds and explained ways to help  to expel gas. Parents verbalized understanding.

## 2022-01-26 NOTE — PROGRESS NOTES
0710: Bedside and verbal shift change report given to [address] [place]  RN and EASTON Maya RN by Joe Michelle RN  . Assumed care of pt at this time. 0715: Mother bottle feeding infant at this time. 0830: Shift assessment completed. VSS. Pt denies pain. Fundus firm, umbilicus -2 w/ scant bleeding. Infant POC and supplemental feeding reviewed w/ mother and father. 0940: Rounded on pt. No needs at this time. 1110: Rounded on pt. Pt denies pain. No needs expressed at this time. 1224: Mother resting w/ eyes closed. : D/C teaching complete and copy of D/C teaching instruction given to pt with verbal understanding. Pt given opportunity for questions and denies comments/concerns/questions at this time. Blood band removed and shredded. Mothers band # verified w/ infants.

## 2022-01-26 NOTE — DISCHARGE SUMMARY
Obstetrical Discharge Summary     Name: Maximilian Medina MRN: [id number]  SSN: [id number]    YOB: 1995  Age: 27 y.o. Sex: female      Admit Date: 2022    Discharge Date: 2022     Admitting Physician: Joleen Canavan, MD     Attending Physician:  Mili Clay MD     Admission Diagnoses: Normal IUP (intrauterine pregnancy) on prenatal ultrasound, third trimester [Z34.93]    Discharge Diagnoses:   Information for the patient's :  Svetlana Camarena [[id number]]   Delivery of a 2.75 kg female infant via Vaginal, Spontaneous on 2022 at 9:29 PM  by Pandora Carrel. Apgars were 9  and 9 . Additional Diagnoses:   Hospital Problems  Date Reviewed: 2018          Codes Class Noted POA    * (Principal)  (spontaneous vaginal delivery) ICD-10-CM: O80  ICD-9-CM: 650  2022 Unknown        Normal IUP (intrauterine pregnancy) on prenatal ultrasound, third trimester ICD-10-CM: Z34.93  ICD-9-CM: V22.2  2022 Unknown             Lab Results   Component Value Date/Time    Rubella, External immune 2021 12:00 AM    GrBStrep, External negative 2022 12:00 AM       Immunization(s):   There is no immunization history on file for this patient. Hospital Course: Normal hospital course following the delivery. Patient Instructions:   Current Discharge Medication List      START taking these medications    Details   ibuprofen (MOTRIN) 800 mg tablet Take 1 Tablet by mouth every eight (8) hours as needed (Pain scale 4-6). Qty: 90 Tablet, Refills: 0         CONTINUE these medications which have NOT CHANGED    Details   prenatal vit-iron fumarate-fa 27 mg iron- 0.8 mg tab tablet Take 1 Tab by mouth daily. Indications: pregnancy  Qty: 90 Tab, Refills: 3    Associated Diagnoses: Less than 8 weeks gestation of pregnancy;  Missed menses         STOP taking these medications       ondansetron (ZOFRAN ODT) 4 mg disintegrating tablet Comments:   Reason for Stopping: Reference my discharge instructions.     Follow-up Appointments   Procedures    FOLLOW UP VISIT Appointment in: 6 Weeks Please call the OB office to schedule a 6 week postpartum appointment     Please call the OB office to schedule a 6 week postpartum appointment     Standing Status:   Standing     Number of Occurrences:   1     Order Specific Question:   Appointment in     Answer:   6 Weeks        Signed By:  William Hicks CNM     January 26, 2022

## 2022-01-26 NOTE — PROGRESS NOTES
Progress Note    Patient: Gene Chiu MRN: [id number]  SSN: [id number]    YOB: 1995  Age: 27 y.o. Sex: female      Subjective:     Postpartum Day: 1 Vaginal Delivery    The patient is without complaints. The patient is ambulating well. The patient is tolerating a normal diet. The baby is well. Objective:      Patient Vitals for the past 8 hrs:   BP Temp Pulse Resp SpO2   01/25/22 2304 104/66 98.4 °F (36.9 °C) 82 16 100 %     LABS: No results found for this or any previous visit (from the past 24 hour(s)). Lab Results   Component Value Date/Time    HGB 9.8 (L) 01/25/2022 01:13 AM     Lab Results   Component Value Date/Time    HCT 30.7 (L) 01/25/2022 01:13 AM      Lochia:  appropriate   Uterine Fundus:   firm, -1     Lab/Data Review: All lab results for the last 24 hours reviewed. Assessment:     Status post: Doing well postpartum vaginal delivery day 1. Plan:     Continue day 1 post-vaginal delivery orders. Postpartum care discussed including diet, ambulation, and actvitiy restrictions.    Plan for dc on 1/26 per pt request     Signed By: Joe Boles CNM     January 26, 2022

## 2022-01-26 NOTE — LACTATION NOTE
This note was copied from a baby's chart. RN in room, will return. 1010 Per mom, baby not want to breastfeed anymore. Discussed concerns of weight loss and that mom can attempt nursing again when milk supply is established or to start pumping and exclusively pump. Mom states she may still not want to do either. Discussed how to dry up milk supply if mom chooses to no longer breastfeed. Mom verbalized understanding and no questions at this time. Updated C. Husam Cox RN.

## 2022-01-26 NOTE — LACTATION NOTE
This note was copied from a baby's chart. Formula provided to mom and educated on infant's stomach size, WNL volume intake in , how to safely prepare formula, bottle hygiene, appropriate way to feed infant with bottle, and burping techniques. Mother and father verbalized understanding and no questions at this time.

## 2022-01-26 NOTE — PROGRESS NOTES
0710: Bedside and verbal shift change report given to YANICK Maya RN and REBECCA Clemente Doing by Elena Coronado. Gavin Hodgkins, RN . Assumed care of pt at this time.

## 2022-01-26 NOTE — PROGRESS NOTES
Problem: Patient Education: Go to Patient Education Activity  Goal: Patient/Family Education  1/26/2022 0440 by Jamil Nickerson  Outcome: Progressing Towards Goal  1/26/2022 0440 by Gregory LOPEZ  Outcome: Progressing Towards Goal     Problem: Vaginal Delivery: Day of Deliver-Laboring  Goal: Off Pathway (Use only if patient is Off Pathway)  1/26/2022 0440 by Gregory LOPEZ  Outcome: Progressing Towards Goal  1/26/2022 0440 by Jamil Nickerson  Outcome: Progressing Towards Goal  Goal: Activity/Safety  1/26/2022 0440 by Gregory LOPEZ  Outcome: Progressing Towards Goal  1/26/2022 0440 by Jamil Graham  Outcome: Progressing Towards Goal  Goal: Consults, if ordered  1/26/2022 0440 by Jamil Graham  Outcome: Progressing Towards Goal  1/26/2022 0440 by Gregory LOPEZ  Outcome: Progressing Towards Goal  Goal: Diagnostic Test/Procedures  1/26/2022 0440 by Gregory LOPEZ  Outcome: Progressing Towards Goal  1/26/2022 0440 by Jamil Nickerson  Outcome: Progressing Towards Goal  Goal: Nutrition/Diet  1/26/2022 0440 by Gregory LOPEZ  Outcome: Progressing Towards Goal  1/26/2022 0440 by Gregory LOPEZ  Outcome: Progressing Towards Goal  Goal: Discharge Planning  1/26/2022 0440 by Gregory LOPEZ  Outcome: Progressing Towards Goal  1/26/2022 0440 by Gregory LOPEZ  Outcome: Progressing Towards Goal  Goal: Medications  Outcome: Progressing Towards Goal  Goal: Respiratory  Outcome: Progressing Towards Goal  Goal: Treatments/Interventions/Procedures  Outcome: Progressing Towards Goal  Goal: *Vital signs within defined limits  Outcome: Progressing Towards Goal  Goal: *Labs within defined limits  Outcome: Progressing Towards Goal  Goal: *Hemodynamically stable  Outcome: Progressing Towards Goal  Goal: *Optimal pain control at patient's stated goal  Outcome: Progressing Towards Goal     Problem: Vaginal Delivery: Day of Delivery-Post delivery  Goal: Off Pathway (Use only if patient is Off Pathway)  Outcome: Progressing Towards Goal  Goal: Activity/Safety  Outcome: Progressing Towards Goal  Goal: Consults, if ordered  Outcome: Progressing Towards Goal  Goal: Nutrition/Diet  Outcome: Progressing Towards Goal  Goal: Discharge Planning  Outcome: Progressing Towards Goal  Goal: Medications  Outcome: Progressing Towards Goal  Goal: Treatments/Interventions/Procedures  Outcome: Progressing Towards Goal  Goal: *Vital signs within defined limits  Outcome: Progressing Towards Goal  Goal: *Labs within defined limits  Outcome: Progressing Towards Goal  Goal: *Hemodynamically stable  Outcome: Progressing Towards Goal  Goal: *Optimal pain control at patient's stated goal  Outcome: Progressing Towards Goal  Goal: *Participates in infant care  Outcome: Progressing Towards Goal  Goal: *Demonstrates progressive activity  Outcome: Progressing Towards Goal  Goal: *Tolerating diet  Outcome: Progressing Towards Goal     Problem: Vaginal Delivery: Postpartum Day 1  Goal: Off Pathway (Use only if patient is Off Pathway)  Outcome: Progressing Towards Goal  Goal: Activity/Safety  Outcome: Progressing Towards Goal  Goal: Consults, if ordered  Outcome: Progressing Towards Goal  Goal: Diagnostic Test/Procedures  Outcome: Progressing Towards Goal  Goal: Discharge Planning  Outcome: Progressing Towards Goal  Goal: Medications  Outcome: Progressing Towards Goal  Goal: Treatments/Interventions/Procedures  Outcome: Progressing Towards Goal  Goal: Psychosocial  Outcome: Progressing Towards Goal  Goal: *Vital signs within defined limits  Outcome: Progressing Towards Goal  Goal: *Labs within defined limits  Outcome: Progressing Towards Goal  Goal: *Hemodynamically stable  Outcome: Progressing Towards Goal  Goal: *Optimal pain control at patient's stated goal  Outcome: Progressing Towards Goal  Goal: *Participates in infant care  Outcome: Progressing Towards Goal  Goal: *Demonstrates progressive activity  Outcome: Progressing Towards Goal  Goal: *Performs self perineal care  Outcome: Progressing Towards Goal  Goal: *Appropriate parent-infant bonding  Outcome: Progressing Towards Goal  Goal: *Performs self breast care  Outcome: Progressing Towards Goal     Problem: Vaginal Delivery: Postpartum 2  Goal: Off Pathway (Use only if patient is Off Pathway)  Outcome: Progressing Towards Goal  Goal: Activity/Safety  Outcome: Progressing Towards Goal  Goal: Consults, if ordered  Outcome: Progressing Towards Goal  Goal: Nutrition/Diet  Outcome: Progressing Towards Goal  Goal: Discharge Planning  Outcome: Progressing Towards Goal  Goal: Medications  Outcome: Progressing Towards Goal  Goal: Treatments/Interventions/Procedures  Outcome: Progressing Towards Goal  Goal: Psychosocial  Outcome: Progressing Towards Goal     Problem: Vaginal Delivery: Discharge Outcomes  Goal: *Verbalizes name, dosage, time, side effects, and number of days to continue medications  Outcome: Progressing Towards Goal  Goal: *Describes available resources and support systems  Outcome: Progressing Towards Goal  Goal: *No signs and symptoms of infection  Outcome: Progressing Towards Goal  Goal: *Birth certificate information completed  Outcome: Progressing Towards Goal  Goal: *Received and verbalizes understanding of discharge plan and instructions  Outcome: Progressing Towards Goal  Goal: *Vital signs within defined limits  Outcome: Progressing Towards Goal  Goal: *Labs within defined limits  Outcome: Progressing Towards Goal  Goal: *Hemodynamically stable  Outcome: Progressing Towards Goal  Goal: *Optimal pain control at patient's stated goal  Outcome: Progressing Towards Goal  Goal: *Participates in infant care  Outcome: Progressing Towards Goal  Goal: *Demonstrates progressive activity  Outcome: Progressing Towards Goal  Goal: *Appropriate parent-infant bonding  Outcome: Progressing Towards Goal  Goal: *Tolerating diet  Outcome: Progressing Towards Goal     Problem: Pain  Goal: *Control of Pain  Outcome: Progressing Towards Goal